# Patient Record
Sex: FEMALE | Race: OTHER | Employment: PART TIME | URBAN - METROPOLITAN AREA
[De-identification: names, ages, dates, MRNs, and addresses within clinical notes are randomized per-mention and may not be internally consistent; named-entity substitution may affect disease eponyms.]

---

## 2023-01-06 VITALS
TEMPERATURE: 98.2 F | DIASTOLIC BLOOD PRESSURE: 86 MMHG | HEART RATE: 102 BPM | SYSTOLIC BLOOD PRESSURE: 142 MMHG | BODY MASS INDEX: 31.01 KG/M2 | WEIGHT: 175 LBS | OXYGEN SATURATION: 99 % | HEIGHT: 63 IN | RESPIRATION RATE: 16 BRPM

## 2023-01-06 LAB
ALBUMIN SERPL-MCNC: 4.3 G/DL (ref 3.5–5)
ALBUMIN/GLOB SERPL: 1.2 {RATIO} (ref 1.1–2.2)
ALP SERPL-CCNC: 68 U/L (ref 45–117)
ALT SERPL-CCNC: 21 U/L (ref 12–78)
ANION GAP SERPL CALC-SCNC: 6 MMOL/L (ref 5–15)
AST SERPL-CCNC: 12 U/L (ref 15–37)
BASOPHILS # BLD: 0 K/UL (ref 0–0.1)
BASOPHILS NFR BLD: 1 % (ref 0–1)
BILIRUB SERPL-MCNC: 0.4 MG/DL (ref 0.2–1)
BUN SERPL-MCNC: 13 MG/DL (ref 6–20)
BUN/CREAT SERPL: 16 (ref 12–20)
CALCIUM SERPL-MCNC: 9.8 MG/DL (ref 8.5–10.1)
CHLORIDE SERPL-SCNC: 109 MMOL/L (ref 97–108)
CO2 SERPL-SCNC: 28 MMOL/L (ref 21–32)
COMMENT, HOLDF: NORMAL
CREAT SERPL-MCNC: 0.8 MG/DL (ref 0.55–1.02)
DIFFERENTIAL METHOD BLD: ABNORMAL
EOSINOPHIL # BLD: 0.1 K/UL (ref 0–0.4)
EOSINOPHIL NFR BLD: 2 % (ref 0–7)
ERYTHROCYTE [DISTWIDTH] IN BLOOD BY AUTOMATED COUNT: 11.7 % (ref 11.5–14.5)
GLOBULIN SER CALC-MCNC: 3.5 G/DL (ref 2–4)
GLUCOSE SERPL-MCNC: 123 MG/DL (ref 65–100)
HCT VFR BLD AUTO: 40.6 % (ref 35–47)
HGB BLD-MCNC: 13.4 G/DL (ref 11.5–16)
IMM GRANULOCYTES # BLD AUTO: 0 K/UL (ref 0–0.04)
IMM GRANULOCYTES NFR BLD AUTO: 0 % (ref 0–0.5)
LYMPHOCYTES # BLD: 3.7 K/UL (ref 0.8–3.5)
LYMPHOCYTES NFR BLD: 44 % (ref 12–49)
MCH RBC QN AUTO: 29.3 PG (ref 26–34)
MCHC RBC AUTO-ENTMCNC: 33 G/DL (ref 30–36.5)
MCV RBC AUTO: 88.6 FL (ref 80–99)
MONOCYTES # BLD: 0.8 K/UL (ref 0–1)
MONOCYTES NFR BLD: 9 % (ref 5–13)
NEUTS SEG # BLD: 3.8 K/UL (ref 1.8–8)
NEUTS SEG NFR BLD: 44 % (ref 32–75)
NRBC # BLD: 0 K/UL (ref 0–0.01)
NRBC BLD-RTO: 0 PER 100 WBC
PLATELET # BLD AUTO: 442 K/UL (ref 150–400)
PMV BLD AUTO: 9.1 FL (ref 8.9–12.9)
POTASSIUM SERPL-SCNC: 3.5 MMOL/L (ref 3.5–5.1)
PROT SERPL-MCNC: 7.8 G/DL (ref 6.4–8.2)
RBC # BLD AUTO: 4.58 M/UL (ref 3.8–5.2)
SAMPLES BEING HELD,HOLD: NORMAL
SODIUM SERPL-SCNC: 143 MMOL/L (ref 136–145)
WBC # BLD AUTO: 8.4 K/UL (ref 3.6–11)

## 2023-01-06 PROCEDURE — 84443 ASSAY THYROID STIM HORMONE: CPT

## 2023-01-06 PROCEDURE — 36415 COLL VENOUS BLD VENIPUNCTURE: CPT

## 2023-01-06 PROCEDURE — 84439 ASSAY OF FREE THYROXINE: CPT

## 2023-01-06 PROCEDURE — 99284 EMERGENCY DEPT VISIT MOD MDM: CPT

## 2023-01-06 PROCEDURE — 80053 COMPREHEN METABOLIC PANEL: CPT

## 2023-01-06 PROCEDURE — 85025 COMPLETE CBC W/AUTO DIFF WBC: CPT

## 2023-01-07 ENCOUNTER — HOSPITAL ENCOUNTER (EMERGENCY)
Age: 23
Discharge: HOME | End: 2023-01-07
Attending: EMERGENCY MEDICINE
Payer: COMMERCIAL

## 2023-01-07 DIAGNOSIS — R53.83 FATIGUE, UNSPECIFIED TYPE: ICD-10-CM

## 2023-01-07 DIAGNOSIS — R79.89 ELEVATED TSH: ICD-10-CM

## 2023-01-07 DIAGNOSIS — R42 DIZZINESS: Primary | ICD-10-CM

## 2023-01-07 LAB
APPEARANCE UR: CLEAR
ATRIAL RATE: 92 BPM
BACTERIA URNS QL MICRO: NEGATIVE /HPF
BILIRUB UR QL: NEGATIVE
CALCULATED P AXIS, ECG09: 17 DEGREES
CALCULATED R AXIS, ECG10: 44 DEGREES
CALCULATED T AXIS, ECG11: 12 DEGREES
COLOR UR: NORMAL
DIAGNOSIS, 93000: NORMAL
EPITH CASTS URNS QL MICRO: NORMAL /LPF
GLUCOSE UR STRIP.AUTO-MCNC: NEGATIVE MG/DL
HCG UR QL: NEGATIVE
HGB UR QL STRIP: NEGATIVE
HYALINE CASTS URNS QL MICRO: NORMAL /LPF (ref 0–2)
KETONES UR QL STRIP.AUTO: NEGATIVE MG/DL
LEUKOCYTE ESTERASE UR QL STRIP.AUTO: NEGATIVE
NITRITE UR QL STRIP.AUTO: NEGATIVE
P-R INTERVAL, ECG05: 138 MS
PH UR STRIP: 6.5 [PH] (ref 5–8)
PROT UR STRIP-MCNC: NEGATIVE MG/DL
Q-T INTERVAL, ECG07: 346 MS
QRS DURATION, ECG06: 84 MS
QTC CALCULATION (BEZET), ECG08: 427 MS
RBC #/AREA URNS HPF: NORMAL /HPF (ref 0–5)
SP GR UR REFRACTOMETRY: 1.01 (ref 1–1.03)
T4 FREE SERPL-MCNC: 1.1 NG/DL (ref 0.8–1.5)
TSH SERPL DL<=0.05 MIU/L-ACNC: 5.52 UIU/ML (ref 0.36–3.74)
UR CULT HOLD, URHOLD: NORMAL
UROBILINOGEN UR QL STRIP.AUTO: 0.2 EU/DL (ref 0.2–1)
VENTRICULAR RATE, ECG03: 92 BPM
WBC URNS QL MICRO: NORMAL /HPF (ref 0–4)

## 2023-01-07 PROCEDURE — 81025 URINE PREGNANCY TEST: CPT

## 2023-01-07 PROCEDURE — 81001 URINALYSIS AUTO W/SCOPE: CPT

## 2023-01-07 PROCEDURE — 74011250636 HC RX REV CODE- 250/636: Performed by: EMERGENCY MEDICINE

## 2023-01-07 RX ADMIN — SODIUM CHLORIDE 1000 ML: 9 INJECTION, SOLUTION INTRAVENOUS at 01:27

## 2023-01-07 NOTE — ED TRIAGE NOTES
Patient co lightheadedness and dizziness x 6 months worse today, saying she is feeling warm flushes down her body, states \" I just dont feel right\".

## 2023-01-07 NOTE — ED PROVIDER NOTES
Patient co lightheadedness and dizziness x 6 months worse today, saying she is feeling warm flushes down her body, states \" I just dont feel right\". 19-year-old female presenting to the ER with report of lightheadedness and dizziness and feeling of fatigue and feeling flushed. Patient's been having the symptoms intermittently for the last 6 months. Patient has been having work-up done by her primary care provider. Reports that vitamin D level was low and started on treatment and been having some fluctuating TSH levels. Had recurrent episodes today which she just did not feel right. No numbness or weakness but was having some tingling throughout. Denies any hyperventilation. No headache. No chest pain or palpitations. No fevers or chills but today felt flushed. Reports that she has been eating drinking normally. No weight gain or weight loss. Denies any change in menstrual cycle or bowel habits. No hair thinning. No past medical history on file. No past surgical history on file. No family history on file.     Social History     Socioeconomic History    Marital status:      Spouse name: Not on file    Number of children: Not on file    Years of education: Not on file    Highest education level: Not on file   Occupational History    Not on file   Tobacco Use    Smoking status: Not on file    Smokeless tobacco: Not on file   Substance and Sexual Activity    Alcohol use: Not on file    Drug use: Not on file    Sexual activity: Not on file   Other Topics Concern    Not on file   Social History Narrative    Not on file     Social Determinants of Health     Financial Resource Strain: Not on file   Food Insecurity: Not on file   Transportation Needs: Not on file   Physical Activity: Not on file   Stress: Not on file   Social Connections: Not on file   Intimate Partner Violence: Not on file   Housing Stability: Not on file         ALLERGIES: Patient has no allergy information on record. Review of Systems   Constitutional:  Positive for fatigue. Negative for chills and fever. HENT:  Negative for congestion and sore throat. Eyes:  Negative for pain. Respiratory:  Negative for shortness of breath. Cardiovascular:  Negative for chest pain. Gastrointestinal:  Negative for abdominal pain, diarrhea, nausea and vomiting. Genitourinary:  Negative for dysuria and flank pain. Musculoskeletal:  Negative for back pain and neck pain. Skin:  Negative for rash. Neurological:  Positive for dizziness and light-headedness. Negative for headaches. All other systems reviewed and are negative. Vitals:    01/06/23 2303   BP: (!) 142/86   Pulse: (!) 102   Resp: 16   Temp: 98.2 °F (36.8 °C)   SpO2: 99%   Weight: 79.4 kg (175 lb)   Height: 5' 3\" (1.6 m)            Physical Exam  Vitals and nursing note reviewed. Constitutional:       Appearance: She is well-developed. HENT:      Head: Normocephalic. Nose: Nose normal.      Mouth/Throat:      Mouth: Mucous membranes are moist.   Eyes:      Conjunctiva/sclera: Conjunctivae normal.   Cardiovascular:      Rate and Rhythm: Normal rate and regular rhythm. Pulmonary:      Effort: Pulmonary effort is normal. No respiratory distress. Breath sounds: Normal breath sounds. Abdominal:      General: Bowel sounds are normal.      Palpations: Abdomen is soft. Tenderness: There is no abdominal tenderness. Musculoskeletal:         General: Normal range of motion. Cervical back: Normal range of motion and neck supple. Skin:     General: Skin is warm. Capillary Refill: Capillary refill takes less than 2 seconds. Findings: No rash. Neurological:      Mental Status: She is alert and oriented to person, place, and time. Comments: No gross motor or sensory deficits        Medical Decision Making  Patient with ongoing symptoms of dizziness lightheadedness fatigue. Patient has been having work-up done outpatient. But had episode done today. Due to the dizziness ordered EKG and labs and electrolytes. EKG was unremarkable and personal interpretation in ED course. Ordered labs and electrolytes. No electrolyte abnormalities normal kidney function normal CBC. TSH mildly elevated we will send T4 however not significantly elevated to the point they should be causing her symptoms. Advised patient followed up with her family doctor. Amount and/or Complexity of Data Reviewed  Labs: ordered. Decision-making details documented in ED Course. ECG/medicine tests: ordered and independent interpretation performed. Decision-making details documented in ED Course. ED Course as of 01/07/23 0135   Sat Jan 07, 2023   0116 1:16 AM  EKG: NSR. Rate 92. Normal intervals, normal axis, no ST-elevations or depressions. No signs of ischemia. Interpreted by Jake Myrick MD       [ZD]      ED Course User Index  [ZD] Alvaro Sánchez MD       Procedures      Recent Results (from the past 24 hour(s))   CBC WITH AUTOMATED DIFF    Collection Time: 01/06/23 11:11 PM   Result Value Ref Range    WBC 8.4 3.6 - 11.0 K/uL    RBC 4.58 3.80 - 5.20 M/uL    HGB 13.4 11.5 - 16.0 g/dL    HCT 40.6 35.0 - 47.0 %    MCV 88.6 80.0 - 99.0 FL    MCH 29.3 26.0 - 34.0 PG    MCHC 33.0 30.0 - 36.5 g/dL    RDW 11.7 11.5 - 14.5 %    PLATELET 239 (H) 564 - 400 K/uL    MPV 9.1 8.9 - 12.9 FL    NRBC 0.0 0  WBC    ABSOLUTE NRBC 0.00 0.00 - 0.01 K/uL    NEUTROPHILS 44 32 - 75 %    LYMPHOCYTES 44 12 - 49 %    MONOCYTES 9 5 - 13 %    EOSINOPHILS 2 0 - 7 %    BASOPHILS 1 0 - 1 %    IMMATURE GRANULOCYTES 0 0.0 - 0.5 %    ABS. NEUTROPHILS 3.8 1.8 - 8.0 K/UL    ABS. LYMPHOCYTES 3.7 (H) 0.8 - 3.5 K/UL    ABS. MONOCYTES 0.8 0.0 - 1.0 K/UL    ABS. EOSINOPHILS 0.1 0.0 - 0.4 K/UL    ABS. BASOPHILS 0.0 0.0 - 0.1 K/UL    ABS. IMM.  GRANS. 0.0 0.00 - 0.04 K/UL    DF AUTOMATED     METABOLIC PANEL, COMPREHENSIVE    Collection Time: 01/06/23 11:11 PM   Result Value Ref Range Sodium 143 136 - 145 mmol/L    Potassium 3.5 3.5 - 5.1 mmol/L    Chloride 109 (H) 97 - 108 mmol/L    CO2 28 21 - 32 mmol/L    Anion gap 6 5 - 15 mmol/L    Glucose 123 (H) 65 - 100 mg/dL    BUN 13 6 - 20 MG/DL    Creatinine 0.80 0.55 - 1.02 MG/DL    BUN/Creatinine ratio 16 12 - 20      eGFR >60 >60 ml/min/1.73m2    Calcium 9.8 8.5 - 10.1 MG/DL    Bilirubin, total 0.4 0.2 - 1.0 MG/DL    ALT (SGPT) 21 12 - 78 U/L    AST (SGOT) 12 (L) 15 - 37 U/L    Alk. phosphatase 68 45 - 117 U/L    Protein, total 7.8 6.4 - 8.2 g/dL    Albumin 4.3 3.5 - 5.0 g/dL    Globulin 3.5 2.0 - 4.0 g/dL    A-G Ratio 1.2 1.1 - 2.2     SAMPLES BEING HELD    Collection Time: 01/06/23 11:11 PM   Result Value Ref Range    SAMPLES BEING HELD [id number]     COMMENT        Add-on orders for these samples will be processed based on acceptable specimen integrity and analyte stability, which may vary by analyte. TSH 3RD GENERATION    Collection Time: 01/06/23 11:11 PM   Result Value Ref Range    TSH 5.52 (H) 0.36 - 3.74 uIU/mL   EKG, 12 LEAD, INITIAL    Collection Time: 01/07/23  1:13 AM   Result Value Ref Range    Ventricular Rate 92 BPM    Atrial Rate 92 BPM    P-R Interval 138 ms    QRS Duration 84 ms    Q-T Interval 346 ms    QTC Calculation (Bezet) 427 ms    Calculated P Axis 17 degrees    Calculated R Axis 44 degrees    Calculated T Axis 12 degrees    Diagnosis       Normal sinus rhythm  Normal ECG  No previous ECGs available     URINE CULTURE HOLD SAMPLE    Collection Time: 01/07/23  1:27 AM    Specimen: Urine   Result Value Ref Range    Urine culture hold        Urine on hold in Microbiology dept for 2 days. If unpreserved urine is submitted, it cannot be used for addtional testing after 24 hours, recollection will be required. No results found.

## 2023-03-16 ENCOUNTER — OFFICE VISIT (OUTPATIENT)
Dept: NEUROLOGY | Age: 23
End: 2023-03-16

## 2023-03-16 ENCOUNTER — TELEPHONE (OUTPATIENT)
Dept: NEUROLOGY | Age: 23
End: 2023-03-16

## 2023-03-16 VITALS
OXYGEN SATURATION: 98 % | RESPIRATION RATE: 18 BRPM | SYSTOLIC BLOOD PRESSURE: 129 MMHG | HEIGHT: 63 IN | BODY MASS INDEX: 31.36 KG/M2 | DIASTOLIC BLOOD PRESSURE: 73 MMHG | WEIGHT: 177 LBS | HEART RATE: 106 BPM

## 2023-03-16 DIAGNOSIS — R20.2 PARESTHESIA: Primary | ICD-10-CM

## 2023-03-16 DIAGNOSIS — G43.909 MIGRAINE WITHOUT STATUS MIGRAINOSUS, NOT INTRACTABLE, UNSPECIFIED MIGRAINE TYPE: ICD-10-CM

## 2023-03-16 RX ORDER — ALBUTEROL SULFATE 90 UG/1
AEROSOL, METERED RESPIRATORY (INHALATION)
COMMUNITY

## 2023-03-16 RX ORDER — ESCITALOPRAM OXALATE 10 MG/1
TABLET ORAL
COMMUNITY
Start: 2023-02-02

## 2023-03-16 RX ORDER — CETIRIZINE HYDROCHLORIDE 10 MG/1
TABLET ORAL
COMMUNITY

## 2023-03-16 RX ORDER — ERGOCALCIFEROL 1.25 MG/1
1 CAPSULE ORAL
COMMUNITY
Start: 2022-06-10 | End: 2023-08-01

## 2023-03-16 RX ORDER — BUTALBITAL, ACETAMINOPHEN AND CAFFEINE 50; 325; 40 MG/1; MG/1; MG/1
TABLET ORAL
Qty: 30 TABLET | Refills: 4 | Status: SHIPPED | OUTPATIENT
Start: 2023-03-16

## 2023-03-16 RX ORDER — GALCANEZUMAB 120 MG/ML
120 INJECTION, SOLUTION SUBCUTANEOUS
Qty: 1 ML | Refills: 4 | Status: SHIPPED | OUTPATIENT
Start: 2023-03-16

## 2023-03-16 NOTE — PATIENT INSTRUCTIONS
[facility] Test Result Communication    Test results are available in [address] [employer] is the patient portal into our electronic health record. This feature allows patients to see diagnostic test results, immunizations, allergies, past medical and surgical history, current medications, and send messages directly to providers. Our team members at the  can provide additional information and assist with registration. The [employer] support team can be reached at [phone]. In some cases, a provider might need time to explain the results in detail during a follow-up appointment. This might include additional information or context that will help patients understand the reason for next steps in the plan of care recommended by their provider. If a patient chooses to receive diagnostic testing at an imaging center outside of the [facility], it is the patient's responsibility to bring the imaging report and disc to their [facility] follow-up appointment. If the test results reveal anything that is particularly noteworthy, we will contact you to discuss the matter and, if necessary, schedule a follow-up appointment at an earlier date. If you have not received your test results by [employer] or other communication within 7 days, please contact our office. An inquiry can be sent to your provider using [employer]. Alternatively, appointments can be scheduled via telephone to review results. If a follow-up appointment to review results has not been scheduled, [address] office can be reached at [phone]. For appointments at our [facility] or [facility] office, please call [phone].        [facility]   Statement to Patients  April 1, 2014      In an effort to ensure the large volume of patient prescription refills is processed in the most efficient and expeditious manner, we are asking our patients to assist us by calling your Pharmacy for all prescription refills, this will include also your  Mail Order Pharmacy. The pharmacy will contact our office electronically to continue the refill process. Please do not wait until the last minute to call your pharmacy. We need at least 48 hours (2days) to fill prescriptions. We also encourage you to call your pharmacy before going to  your prescription to make sure it is ready. With regard to controlled substance prescription refill requests (narcotic refills) that need to be picked up at our office, we ask your cooperation by providing us with at least 72 hours (3days) notice that you will need a refill. We will not refill narcotic prescription refill requests after 4:00pm on any weekday, Monday through Thursday, or after 2:00pm on Fridays, or on the weekends. We encourage everyone to explore another way of getting your prescription refill request processed using [employer], our patient web portal through our electronic medical record system. [employer] is an efficient and effective way to communicate your medication request directly to the office and  downloadable as an mary kate on your smart phone . [employer] also features a review functionality that allows you to view your medication list as well as leave messages for your physician. Are you ready to get connected? If so please review the attatched instructions or speak to any of our staff to get you set up right away! Thank you so much for your cooperation. Should you have any questions please contact our Practice Administrator.

## 2023-03-17 NOTE — PROGRESS NOTES
Mariela Adame is a 21 y.o. female who presents with the following  Chief Complaint   Patient presents with    New Patient    Headache     Started a few mo ago, has almost daily HAs., occasional nausea, phonophobia.       Dizziness     With fatigue since last June, uses CPAP, dx'd with mild DELL    Ringing in Ear     A couple of times per week, mainly left ear       HPI    New patient comes in for headaches and paresthesia  She does notice that she does get migraines fairly regularly here now  She does have headaches that come frequently also  She does have nausea and hypersensitivity to light sound and smell  They are usually located unilateral as a sharp pain  She has been having some dizziness and fatigue also since last June  She was just recently diagnosed with sleep apnea and did just start her nasal CPAP here recently  Nothing changed yet  She does get some tendinitis in the ears couple times a week  Mainly the left ear  She has had migraines for quite some time  In the past as a younger woman she has been on gabapentin and Topamax  She is currently on Lexapro and feeling stable with this  Unfortunately her blood pressure is low so she is not able to use any kind of blood pressure medications  So we will initiate Emgality here in the office as a preventative for migraine  She has used Fioricet in the past as a rescue  She has also used triptans in the past including Maxalt and Imitrex    She is not really sure of much triggers yet  She is also in school and working  So she is very busy  She is sleeping fairly well  She has a normal appetite  No loss of consciousness or confusion  No memory loss  Overall she feels like everything else is good    She has a family history of MS  She does have some left-sided facial numbness and then right leg numbness and tingling  These both come and go without any kind of cause  They do come with out her headaches also so were not really sure where they are coming from        No Known Allergies    Current Outpatient Medications   Medication Sig    ergocalciferol (ERGOCALCIFEROL) 1,250 mcg (50,000 unit) capsule 1 Capsule. escitalopram oxalate (LEXAPRO) 10 mg tablet 1 tablet Orally Once a day for 90 days    cetirizine (ZYRTEC) 10 mg tablet Zyrtec 10 mg tablet   Take 1 tablet every day by oral route. albuterol (PROVENTIL HFA, VENTOLIN HFA, PROAIR HFA) 90 mcg/actuation inhaler 1 puff as needed Inhalation every 4 hrs for 30 days    galcanezumab-gnlm (Emgality Pen) 120 mg/mL injection 1 mL by SubCUTAneous route every thirty (30) days. ubrogepant Valeta Speer) 100 mg tablet 1 at HA onset and repeat in 2 hours if needed. Max 2 in 24 hours    butalbital-acetaminophen-caffeine (FIORICET, ESGIC) -40 mg per tablet 1-2 tablets by mouth every 8 hours PRN     No current facility-administered medications for this visit. Social History     Tobacco Use   Smoking Status Never   Smokeless Tobacco Never       Past Medical History:   Diagnosis Date    Abnormal TSH     Asthma     DELL (obstructive sleep apnea)        Past Surgical History:   Procedure Laterality Date    HX TYMPANOSTOMY      HX WISDOM TEETH EXTRACTION Bilateral        Family History   Problem Relation Age of Onset    Thyroid Disease Mother     Kidney Disease Father     Asthma Father        Social History     Socioeconomic History    Marital status:    Tobacco Use    Smoking status: Never    Smokeless tobacco: Never   Vaping Use    Vaping Use: Never used   Substance and Sexual Activity    Alcohol use: Yes     Comment: occasional    Drug use: Not Currently       Review of Systems   Eyes:  Positive for blurred vision, double vision and photophobia. Respiratory:  Negative for shortness of breath and wheezing. Cardiovascular:  Negative for chest pain and palpitations. Gastrointestinal:  Positive for nausea and vomiting. Neurological:  Positive for dizziness, tingling, sensory change and headaches. Negative for tremors, seizures, loss of consciousness and weakness. Remainder of comprehensive review is negative. Physical Exam :    Visit Vitals  /73   Pulse (!) 106   Resp 18   Ht 5' 3\" (1.6 m)   Wt 80.3 kg (177 lb)   SpO2 98%   BMI 31.35 kg/m²       General: Well defined, nourished, and groomed individual in no acute distress. Musculoskeletal: Extremities revealed no edema and had full range of motion of joints. Psych: Good mood and bright affect    NEUROLOGICAL EXAMINATION:    Mental Status: Alert and oriented to person, place, and time    Cranial Nerves:    II, III, IV, VI: Visual acuity grossly intact. Visual fields are normal.    Pupils are equal, round, and reactive to light and accommodation. Extra-ocular movements are full and fluid. Fundoscopic exam was benign, no ptosis or nystagmus. V-XII: Hearing is grossly intact. Facial features are symmetric, with normal sensation and strength. The palate rises symmetrically and the tongue protrudes midline. Sternocleidomastoids 5/5. Motor Examination: Normal tone, bulk, and strength, 5/5 muscle strength throughout. Coordination: Finger to nose was normal. No resting or intention tremor    Gait and Station: Steady while walking. Normal arm swing. No pronator drift. No muscle wasting or fasiculations noted. Reflexes: DTRs 2+ throughout. Results for orders placed or performed during the hospital encounter of 01/07/23   URINE CULTURE HOLD SAMPLE    Specimen: Urine   Result Value Ref Range    Urine culture hold        Urine on hold in Microbiology dept for 2 days. If unpreserved urine is submitted, it cannot be used for addtional testing after 24 hours, recollection will be required.    CBC WITH AUTOMATED DIFF   Result Value Ref Range    WBC 8.4 3.6 - 11.0 K/uL    RBC 4.58 3.80 - 5.20 M/uL    HGB 13.4 11.5 - 16.0 g/dL    HCT 40.6 35.0 - 47.0 %    MCV 88.6 80.0 - 99.0 FL    MCH 29.3 26.0 - 34.0 PG    MCHC 33.0 30.0 - 36.5 g/dL    RDW 11.7 11.5 - 14.5 %    PLATELET 021 (H) 023 - 400 K/uL    MPV 9.1 8.9 - 12.9 FL    NRBC 0.0 0  WBC    ABSOLUTE NRBC 0.00 0.00 - 0.01 K/uL    NEUTROPHILS 44 32 - 75 %    LYMPHOCYTES 44 12 - 49 %    MONOCYTES 9 5 - 13 %    EOSINOPHILS 2 0 - 7 %    BASOPHILS 1 0 - 1 %    IMMATURE GRANULOCYTES 0 0.0 - 0.5 %    ABS. NEUTROPHILS 3.8 1.8 - 8.0 K/UL    ABS. LYMPHOCYTES 3.7 (H) 0.8 - 3.5 K/UL    ABS. MONOCYTES 0.8 0.0 - 1.0 K/UL    ABS. EOSINOPHILS 0.1 0.0 - 0.4 K/UL    ABS. BASOPHILS 0.0 0.0 - 0.1 K/UL    ABS. IMM. GRANS. 0.0 0.00 - 0.04 K/UL    DF AUTOMATED     METABOLIC PANEL, COMPREHENSIVE   Result Value Ref Range    Sodium 143 136 - 145 mmol/L    Potassium 3.5 3.5 - 5.1 mmol/L    Chloride 109 (H) 97 - 108 mmol/L    CO2 28 21 - 32 mmol/L    Anion gap 6 5 - 15 mmol/L    Glucose 123 (H) 65 - 100 mg/dL    BUN 13 6 - 20 MG/DL    Creatinine 0.80 0.55 - 1.02 MG/DL    BUN/Creatinine ratio 16 12 - 20      eGFR >60 >60 ml/min/1.73m2    Calcium 9.8 8.5 - 10.1 MG/DL    Bilirubin, total 0.4 0.2 - 1.0 MG/DL    ALT (SGPT) 21 12 - 78 U/L    AST (SGOT) 12 (L) 15 - 37 U/L    Alk. phosphatase 68 45 - 117 U/L    Protein, total 7.8 6.4 - 8.2 g/dL    Albumin 4.3 3.5 - 5.0 g/dL    Globulin 3.5 2.0 - 4.0 g/dL    A-G Ratio 1.2 1.1 - 2.2     SAMPLES BEING HELD   Result Value Ref Range    SAMPLES BEING HELD [id number]     COMMENT        Add-on orders for these samples will be processed based on acceptable specimen integrity and analyte stability, which may vary by analyte.    TSH 3RD GENERATION   Result Value Ref Range    TSH 5.52 (H) 0.36 - 3.74 uIU/mL   URINALYSIS W/MICROSCOPIC   Result Value Ref Range    Color YELLOW/STRAW      Appearance CLEAR CLEAR      Specific gravity 1.013 1.003 - 1.030      pH (UA) 6.5 5.0 - 8.0      Protein Negative NEG mg/dL    Glucose Negative NEG mg/dL    Ketone Negative NEG mg/dL    Bilirubin Negative NEG      Blood Negative NEG      Urobilinogen 0.2 0.2 - 1.0 EU/dL    Nitrites Negative NEG Leukocyte Esterase Negative NEG      WBC 0-4 0 - 4 /hpf    RBC 0-5 0 - 5 /hpf    Epithelial cells FEW FEW /lpf    Bacteria Negative NEG /hpf    Hyaline cast 0-2 0 - 2 /lpf   T4, FREE   Result Value Ref Range    T4, Free 1.1 0.8 - 1.5 NG/DL   HCG URINE, QL. - POC   Result Value Ref Range    Pregnancy test,urine (POC) Negative NEG     EKG, 12 LEAD, INITIAL   Result Value Ref Range    Ventricular Rate 92 BPM    Atrial Rate 92 BPM    P-R Interval 138 ms    QRS Duration 84 ms    Q-T Interval 346 ms    QTC Calculation (Bezet) 427 ms    Calculated P Axis 17 degrees    Calculated R Axis 44 degrees    Calculated T Axis 12 degrees    Diagnosis       Normal sinus rhythm  Normal ECG  No previous ECGs available  Confirmed by Damián Renee M.D., Hans Ellison ([id number]) on 1/10/2023 5:07:55 PM         Total time: 40 min   Counseling / coordination time: 35 min   > 50% counseling / coordination?: Yes re: medications, treatment, disease process, imaging. Orders Placed This Encounter    MRI BRAIN W WO CONT     Standing Status:   Future     Standing Expiration Date:   2024     Order Specific Question:   Is Patient Pregnant? Answer:   No     Order Specific Question:   STAT Creatinine as indicated     Answer:   Yes    ergocalciferol (ERGOCALCIFEROL) 1,250 mcg (50,000 unit) capsule     Si Capsule. escitalopram oxalate (LEXAPRO) 10 mg tablet     Si tablet Orally Once a day for 90 days    cetirizine (ZYRTEC) 10 mg tablet     Sig: Zyrtec 10 mg tablet   Take 1 tablet every day by oral route. albuterol (PROVENTIL HFA, VENTOLIN HFA, PROAIR HFA) 90 mcg/actuation inhaler     Si puff as needed Inhalation every 4 hrs for 30 days    galcanezumab-gnlm (Emgality Pen) 120 mg/mL injection     Si mL by SubCUTAneous route every thirty (30) days. Dispense:  1 mL     Refill:  4    ubrogepant (Ubrelvy) 100 mg tablet     Si at HA onset and repeat in 2 hours if needed.   Max 2 in 24 hours     Dispense:  16 Tablet     Refill:  4 butalbital-acetaminophen-caffeine (FIORICET, ESGIC) -40 mg per tablet     Si-2 tablets by mouth every 8 hours PRN     Dispense:  30 Tablet     Refill:  4       1. Paresthesia    2.  Migraine without status migrainosus, not intractable, unspecified migraine type    We need an MRI of the brain to rule out demyelinating disease as primary cause of migraines  Left facial paresthesia  And right leg paresthesia    She does have a family history of MS and this is needed to ensure this is not the primary cause of everything    We will start Emgality in the office and she got the first 2 injections here as a loading sample dose in the office  120 mg every 30 days thereafter    We will refill the Fioricet because when she was younger this did work  We will try Gabriella Herrera as a rescue though for right now to make sure that she can take this and still function    Follow-up after testing and we will evaluate further                This note will not be viewable in [address]

## 2023-03-23 RX ORDER — GALCANEZUMAB 120 MG/ML
INJECTION, SOLUTION SUBCUTANEOUS
Qty: 1 ML | Refills: 4 | Status: SHIPPED | OUTPATIENT
Start: 2023-03-23

## 2023-05-25 ENCOUNTER — PATIENT MESSAGE (OUTPATIENT)
Age: 23
End: 2023-05-25

## 2023-05-25 DIAGNOSIS — R90.89 ABNORMAL BRAIN MRI: ICD-10-CM

## 2023-05-25 DIAGNOSIS — R20.2 PARESTHESIA OF SKIN: Primary | ICD-10-CM

## 2023-05-25 NOTE — TELEPHONE ENCOUNTER
SUSAN Kirby NP 5/25/2023 4:24 PM EDT    Can you fax MRI c spine   See below          ----- Message -----  From: Kat Us  Sent: 5/25/2023 2:55 PM EDT  To: SUSAN Kirby NP  Subject: MRI     Thank you for looking over my MRI. What would the next steps be for ruling out MS? Would you be able send the MRI order to [address] [place] [address]?     Thank you again,  Cathleen Siu

## 2023-06-16 ENCOUNTER — TELEPHONE (OUTPATIENT)
Age: 23
End: 2023-06-16

## 2023-06-16 ENCOUNTER — OFFICE VISIT (OUTPATIENT)
Age: 23
End: 2023-06-16
Payer: COMMERCIAL

## 2023-06-16 VITALS — OXYGEN SATURATION: 99 % | DIASTOLIC BLOOD PRESSURE: 76 MMHG | HEART RATE: 97 BPM | SYSTOLIC BLOOD PRESSURE: 104 MMHG

## 2023-06-16 DIAGNOSIS — G43.909 MIGRAINE WITHOUT STATUS MIGRAINOSUS, NOT INTRACTABLE, UNSPECIFIED MIGRAINE TYPE: Primary | ICD-10-CM

## 2023-06-16 DIAGNOSIS — F90.0 ATTENTION DEFICIT HYPERACTIVITY DISORDER (ADHD), PREDOMINANTLY INATTENTIVE TYPE: ICD-10-CM

## 2023-06-16 PROCEDURE — 99214 OFFICE O/P EST MOD 30 MIN: CPT | Performed by: NURSE PRACTITIONER

## 2023-06-16 RX ORDER — BUTALBITAL, ACETAMINOPHEN AND CAFFEINE 50; 325; 40 MG/1; MG/1; MG/1
TABLET ORAL
COMMUNITY
Start: 2023-03-16

## 2023-06-16 RX ORDER — UBROGEPANT 100 MG/1
TABLET ORAL
COMMUNITY
Start: 2023-05-15

## 2023-06-16 RX ORDER — ESCITALOPRAM OXALATE 10 MG/1
TABLET ORAL
COMMUNITY
Start: 2023-02-02

## 2023-06-16 RX ORDER — ALBUTEROL SULFATE 90 UG/1
AEROSOL, METERED RESPIRATORY (INHALATION)
COMMUNITY

## 2023-06-16 RX ORDER — GALCANEZUMAB 120 MG/ML
INJECTION, SOLUTION SUBCUTANEOUS
COMMUNITY
Start: 2023-03-23

## 2023-06-16 RX ORDER — ERGOCALCIFEROL 1.25 MG/1
CAPSULE ORAL
COMMUNITY
Start: 2023-05-10

## 2023-06-16 RX ORDER — CETIRIZINE HYDROCHLORIDE 10 MG/1
TABLET ORAL
COMMUNITY

## 2023-06-19 NOTE — PROGRESS NOTES
(obstructive sleep apnea)        Past Surgical History:   Procedure Laterality Date    TYMPANOSTOMY TUBE PLACEMENT      WISDOM TOOTH EXTRACTION Bilateral        Family History   Problem Relation Age of Onset    Thyroid Disease Mother     Asthma Father     Kidney Disease Father        Social History     Socioeconomic History    Marital status:    Tobacco Use    Smoking status: Never    Smokeless tobacco: Never   Substance and Sexual Activity    Alcohol use: Yes    Drug use: Not Currently       Review of Systems      Remainder of comprehensive review is negative. Physical Exam :    /76 (Site: Left Upper Arm, Position: Sitting, Cuff Size: Medium Adult)   Pulse 97   SpO2 99%           MRI Result (most recent):  No results found for this or any previous visit from the past 3650 days. CT Result (most recent):  No results found for this or any previous visit from the past 3650 days.       EEG Result:      Carotid Doppler:        Recent Labs:  Lab Results   Component Value Date    WBC 8.4 01/06/2023    HGB 13.4 01/06/2023    HCT 40.6 01/06/2023    MCV 88.6 01/06/2023     (H) 01/06/2023     Lab Results   Component Value Date     01/06/2023    K 3.5 01/06/2023     (H) 01/06/2023    CO2 28 01/06/2023    BUN 13 01/06/2023    CREATININE 0.80 01/06/2023    GLUCOSE 123 (H) 01/06/2023    CALCIUM 9.8 01/06/2023    PROT 7.8 01/06/2023    LABALBU 4.3 01/06/2023    BILITOT 0.4 01/06/2023    ALKPHOS 68 01/06/2023    AST 12 (L) 01/06/2023    ALT 21 01/06/2023    AGRATIO 1.2 01/06/2023    GLOB 3.5 01/06/2023       No results found for: CHOL  No results found for: TRIG  No results found for: HDL  No results found for: LDLCHOLESTEROL, LDLCALC  No results found for: LABVLDL, VLDL  No results found for: CHOLHDLRATIO    No results found for: SEDRATE    No results found for: LABA1C  No results found for: MILDRED             1. Migraine without status migrainosus, not intractable, unspecified migraine

## 2023-06-27 DIAGNOSIS — F90.0 ATTENTION DEFICIT HYPERACTIVITY DISORDER (ADHD), PREDOMINANTLY INATTENTIVE TYPE: Primary | ICD-10-CM

## 2023-06-27 RX ORDER — LISDEXAMFETAMINE DIMESYLATE 40 MG/1
1 CAPSULE ORAL DAILY
Qty: 30 CAPSULE | Refills: 0 | Status: SHIPPED | OUTPATIENT
Start: 2023-06-27 | End: 2023-07-27

## 2023-06-30 DIAGNOSIS — R20.2 PARESTHESIA OF SKIN: Primary | ICD-10-CM

## 2023-06-30 DIAGNOSIS — R93.89 ABNORMAL MRI: ICD-10-CM

## 2023-07-10 ENCOUNTER — HOSPITAL ENCOUNTER (OUTPATIENT)
Facility: HOSPITAL | Age: 23
Discharge: HOME | End: 2023-07-13
Payer: COMMERCIAL

## 2023-07-10 VITALS — DIASTOLIC BLOOD PRESSURE: 69 MMHG | OXYGEN SATURATION: 99 % | SYSTOLIC BLOOD PRESSURE: 113 MMHG | HEART RATE: 98 BPM

## 2023-07-10 DIAGNOSIS — R93.89 ABNORMAL MRI: ICD-10-CM

## 2023-07-10 DIAGNOSIS — R20.2 PARESTHESIA OF SKIN: ICD-10-CM

## 2023-07-10 LAB
APPEARANCE CSF: CLEAR
COLOR CSF: COLORLESS
CRYPTOC AG CSF QL IA: NEGATIVE
PROT CSF-MCNC: 30 MG/DL (ref 15–45)
RBC # CSF: 0 /CU MM
TUBE # CSF: 1
TUBE # CSF: 4
WBC # CSF: 0 /CU MM (ref 0–5)

## 2023-07-10 PROCEDURE — 82040 ASSAY OF SERUM ALBUMIN: CPT

## 2023-07-10 PROCEDURE — 62328 DX LMBR SPI PNXR W/FLUOR/CT: CPT

## 2023-07-10 PROCEDURE — 86618 LYME DISEASE ANTIBODY: CPT

## 2023-07-10 PROCEDURE — 87070 CULTURE OTHR SPECIMN AEROBIC: CPT

## 2023-07-10 PROCEDURE — 89050 BODY FLUID CELL COUNT: CPT

## 2023-07-10 PROCEDURE — 82164 ANGIOTENSIN I ENZYME TEST: CPT

## 2023-07-10 PROCEDURE — 87205 SMEAR GRAM STAIN: CPT

## 2023-07-10 PROCEDURE — 83916 OLIGOCLONAL BANDS: CPT

## 2023-07-10 PROCEDURE — 2500000003 HC RX 250 WO HCPCS

## 2023-07-10 PROCEDURE — 86592 SYPHILIS TEST NON-TREP QUAL: CPT

## 2023-07-10 PROCEDURE — 83873 ASSAY OF CSF PROTEIN: CPT

## 2023-07-10 PROCEDURE — 82784 ASSAY IGA/IGD/IGG/IGM EACH: CPT

## 2023-07-10 PROCEDURE — 87798 DETECT AGENT NOS DNA AMP: CPT

## 2023-07-10 PROCEDURE — 87327 CRYPTOCOCCUS NEOFORM AG IA: CPT

## 2023-07-10 PROCEDURE — 86255 FLUORESCENT ANTIBODY SCREEN: CPT

## 2023-07-10 PROCEDURE — 84157 ASSAY OF PROTEIN OTHER: CPT

## 2023-07-10 PROCEDURE — 82042 OTHER SOURCE ALBUMIN QUAN EA: CPT

## 2023-07-10 RX ORDER — LIDOCAINE HYDROCHLORIDE 10 MG/ML
5 INJECTION, SOLUTION EPIDURAL; INFILTRATION; INTRACAUDAL; PERINEURAL ONCE
Status: COMPLETED | OUTPATIENT
Start: 2023-07-10 | End: 2023-07-10

## 2023-07-10 RX ADMIN — LIDOCAINE HYDROCHLORIDE ANHYDROUS 5 ML: 10 INJECTION, SOLUTION INFILTRATION at 10:41

## 2023-07-10 NOTE — PROGRESS NOTES
1005- Pt brought to radiology post LP supine on stretcher. Pt is A&OX 3 with no C/O pain. Pt stated \" it went well\". VS taken. Lower back band aid dry and intact. 1040- MS panel ordered. Two red tops sent to lab. Pt drinking water and eating cookies and peanut butter crackers.

## 2023-07-10 NOTE — DISCHARGE INSTRUCTIONS
Spinal Tap (Lumbar Puncture): What to Expect at [address]     A spinal tap (also called a lumbar puncture) is a test to check the fluid that surrounds and protects your spinal cord and brain. Your doctor may have done this test to look for an infection. Sometimes it's done to release pressure from too much fluid or to look for diseases such as multiple sclerosis. You may feel tired, and your back may be sore where the needle went in (the puncture site). You may have a mild headache for a day or two. This can happen when some of the spinal fluid is removed. Drinking extra fluids, taking pain medicine, and lying down for several hours after the procedure may help the headache be less severe. Some people also have trouble sleeping for a day or two. The fluid taken during a spinal tap is often sent to a lab for tests. Your doctor or nurse will call you with the test results. This care sheet gives you a general idea about how long it will take for you to recover. But each person recovers at a different pace. Follow the steps below to get better as quickly as possible. How can you care for yourself at home? Activity    Your doctor may tell you to lie flat in bed for 1 to 4 hours after the procedure. Rest when you feel tired. Getting enough sleep will help you recover. Ask your doctor when you can drive again. Diet    Drink extra fluids after the procedure to help a headache be less severe. Medicines    If you stopped taking aspirin or some other blood thinner, your doctor will tell you when to start taking it again. If you have pain, take pain medicines exactly as directed. If the doctor gave you a prescription medicine for pain, take it as prescribed. If you are not taking a prescription pain medicine, ask your doctor if you can take an over-the-counter medicine. If you think your pain medicine is making you sick to your stomach:   Take your medicine after meals (unless your

## 2023-07-11 LAB
BACTERIA SPEC CULT: NORMAL
GRAM STN SPEC: NORMAL
SERVICE CMNT-IMP: NORMAL

## 2023-07-12 LAB
ANGIOTENSIN CONVERTING ENZYME CSF: <1.5 U/L (ref 0–2.5)
MBP CSF-MCNC: 1.4 NG/ML (ref 0–2.9)
REAGIN AB CSF QL: NON REACTIVE

## 2023-07-14 LAB
ALB CSF/SERPL: 3 (ref 0–8)
ALBUMIN CSF-MCNC: 15 MG/DL (ref 7–29)
ALBUMIN SERPL-MCNC: 4.9 G/DL (ref 4–5)
IGG CSF-MCNC: 1.9 MG/DL (ref 0–6.7)
IGG SERPL-MCNC: 1052 MG/DL (ref 586–1602)
IGG SYNTH RATE SER+CSF CALC-MRATE: NORMAL MG/DAY
IGG/ALB CLEAR SER+CSF-RTO: 0.6 (ref 0–0.7)
IGG/ALB CSF: 0.13 (ref 0–0.25)
MBP CSF-MCNC: NORMAL NG/ML
OLIGOCLONAL BANDS.IT SER+CSF QL: NORMAL

## 2023-07-15 LAB — AQP4 H2O CHANNEL IGG CSF QL: NORMAL

## 2023-07-16 LAB — JC VIRUS DNA BY PCR: NEGATIVE

## 2023-07-17 LAB
BACTERIA SPEC CULT: NORMAL
GRAM STN SPEC: NORMAL
SERVICE CMNT-IMP: NORMAL

## 2023-07-20 LAB
B BURGDOR IGG CSF-ACNC: < 0.08 INDEX
B BURGDOR IGM CSF-ACNC: < 0.06 INDEX

## 2023-08-01 DIAGNOSIS — F90.0 ATTENTION DEFICIT HYPERACTIVITY DISORDER (ADHD), PREDOMINANTLY INATTENTIVE TYPE: ICD-10-CM

## 2023-08-01 RX ORDER — LISDEXAMFETAMINE DIMESYLATE 40 MG/1
1 CAPSULE ORAL DAILY
Qty: 30 CAPSULE | Refills: 0 | Status: CANCELLED | OUTPATIENT
Start: 2023-08-01 | End: 2023-08-31

## 2023-08-03 DIAGNOSIS — F90.0 ATTENTION DEFICIT HYPERACTIVITY DISORDER (ADHD), PREDOMINANTLY INATTENTIVE TYPE: ICD-10-CM

## 2023-08-03 RX ORDER — LISDEXAMFETAMINE DIMESYLATE 40 MG/1
1 CAPSULE ORAL DAILY
Qty: 30 CAPSULE | Refills: 0 | Status: SHIPPED | OUTPATIENT
Start: 2023-08-03 | End: 2023-09-02

## 2023-08-03 RX ORDER — GALCANEZUMAB 120 MG/ML
INJECTION, SOLUTION SUBCUTANEOUS
Qty: 1 ML | Refills: 3 | OUTPATIENT
Start: 2023-08-03

## 2023-08-23 DIAGNOSIS — R90.89 ABNORMAL BRAIN MRI: ICD-10-CM

## 2023-08-23 DIAGNOSIS — R20.2 PARESTHESIA OF SKIN: ICD-10-CM

## 2023-08-23 DIAGNOSIS — G43.909 MIGRAINE WITHOUT STATUS MIGRAINOSUS, NOT INTRACTABLE, UNSPECIFIED MIGRAINE TYPE: Primary | ICD-10-CM

## 2023-08-25 DIAGNOSIS — G43.909 MIGRAINE WITHOUT STATUS MIGRAINOSUS, NOT INTRACTABLE, UNSPECIFIED MIGRAINE TYPE: Primary | ICD-10-CM

## 2023-08-26 RX ORDER — GALCANEZUMAB 120 MG/ML
INJECTION, SOLUTION SUBCUTANEOUS
Qty: 1 ML | Refills: 3 | Status: SHIPPED | OUTPATIENT
Start: 2023-08-26

## 2023-09-18 ENCOUNTER — OFFICE VISIT (OUTPATIENT)
Age: 23
End: 2023-09-18
Payer: COMMERCIAL

## 2023-09-18 ENCOUNTER — TELEPHONE (OUTPATIENT)
Age: 23
End: 2023-09-18

## 2023-09-18 VITALS
OXYGEN SATURATION: 99 % | TEMPERATURE: 97.4 F | HEART RATE: 84 BPM | SYSTOLIC BLOOD PRESSURE: 122 MMHG | DIASTOLIC BLOOD PRESSURE: 70 MMHG | RESPIRATION RATE: 16 BRPM

## 2023-09-18 DIAGNOSIS — R42 DIZZINESS: Primary | ICD-10-CM

## 2023-09-18 DIAGNOSIS — F90.0 ATTENTION DEFICIT HYPERACTIVITY DISORDER (ADHD), PREDOMINANTLY INATTENTIVE TYPE: ICD-10-CM

## 2023-09-18 PROCEDURE — 99214 OFFICE O/P EST MOD 30 MIN: CPT | Performed by: NURSE PRACTITIONER

## 2023-09-18 RX ORDER — LISDEXAMFETAMINE DIMESYLATE CAPSULES 30 MG/1
30 CAPSULE ORAL 2 TIMES DAILY
Qty: 60 CAPSULE | Refills: 0 | Status: SHIPPED | OUTPATIENT
Start: 2023-09-18 | End: 2023-10-18

## 2023-09-18 RX ORDER — LISDEXAMFETAMINE DIMESYLATE 40 MG/1
1 CAPSULE ORAL DAILY
Qty: 30 CAPSULE | Refills: 0 | Status: CANCELLED | OUTPATIENT
Start: 2023-09-18 | End: 2023-10-18

## 2023-09-18 NOTE — TELEPHONE ENCOUNTER
BP elevated on admit, now normalized  Continue lisinopril    Faxed clinicals for PA ans testing [id number]

## 2023-09-18 NOTE — PROGRESS NOTES
presenting today and we will get this as a twice daily dosing to help with that    Continue the Emgality for migraine prevention  Continue [place] for migraine as needed  We will follow-up after the test results everything looks good otherwise          This note will not be viewable in [employer]

## 2023-09-29 NOTE — TELEPHONE ENCOUNTER
Called to check status for ans testing [id number]  No PA required  #lruth A  Hold prior to testing   Cetirizine-3 days  Escitalopram-5 days  Butalbital-1 day  Lisdexamfetamine-2 days

## 2023-10-01 ENCOUNTER — PATIENT MESSAGE (OUTPATIENT)
Age: 23
End: 2023-10-01

## 2023-10-02 ENCOUNTER — TELEPHONE (OUTPATIENT)
Age: 23
End: 2023-10-02

## 2023-10-09 NOTE — TELEPHONE ENCOUNTER
Vyvanse   Sent PA to [facility]   [id number] - PA Case ID: [id number]    40 mg cap twice a day    Vyvanse APPROVAL:     Approved today  SXVAUC:[id number];KMVGHG:[id number]; Review Type:Qty;Coverage Start Date:10/11/2023; Coverage End Date:10/11/2024;

## 2023-11-07 ENCOUNTER — PATIENT MESSAGE (OUTPATIENT)
Age: 23
End: 2023-11-07

## 2023-11-09 ENCOUNTER — TELEPHONE (OUTPATIENT)
Age: 23
End: 2023-11-09

## 2023-11-09 NOTE — TELEPHONE ENCOUNTER
Emgality PA submitted via CoverMyMeds   (Key: [id number]) - [id number]    Approved       [facility]:[id number];CLASYD:[id number]; Review Type:Prior Auth; Coverage Start Date:11/09/2023; Coverage End Date:11/08/2024;

## 2023-11-13 ENCOUNTER — PROCEDURE VISIT (OUTPATIENT)
Age: 23
End: 2023-11-13
Payer: COMMERCIAL

## 2023-11-13 DIAGNOSIS — R42 LIGHTHEADED: Primary | ICD-10-CM

## 2023-11-13 PROCEDURE — 95924 ANS PARASYMP & SYMP W/TILT: CPT | Performed by: PSYCHIATRY & NEUROLOGY

## 2023-11-13 PROCEDURE — 95923 AUTONOMIC NRV SYST FUNJ TEST: CPT | Performed by: PSYCHIATRY & NEUROLOGY

## 2023-11-13 NOTE — PROGRESS NOTES
[facility]  [address] [place], [address] [facility]  Jesús Garcia    [id number]  13 y.o.  2000     REFERRED BY: Mansoor Knight NP  PCP: ABBI George    Date of Testin2023       Indication/History:    Episodes of lightheadedness and fatigue (+) headaches    Patient is coming for syncope/autonomic dysfunction evaluation. Medications taken 48 hrs before the test: None     Procedure: This Autonomic Nervous System (ANS) testing is performed by utilizing [facility] [employer] Autonomic System, with established protocol. Multiple procedures performed: Heart rate response to deep breathing (HRDB), Valsalva ratio, Heart rate and BP response to head up tilt (HUT), and Quantitative sudomotor axon reflex testing (QSWEAT) . Result:  Heart response to deep  breathing (HRDB): 2 series of 6 cycles were performed and the mean of 6 consecutive cycles was obtained. Average HR difference was 19.09 and E:I ratio was 1.27. Normal response    Heart rate response to Valsalva maneuver:  [id number] BP to Valsalva was measured and BP response in all 4 phases was normal. Heart response was the opposite of BP, a normal response. ( VR = 2.03 )  HUT (head-up tilt) : [id number]-[id number] BP and HR were measured, up to 15 minutes post tilt. No significant BP reduction or HR acceleration/deceleration. SUDOMOTOR: QSWEAT response showed relatively preserved sweat production in all 4 localities (forearm, proximal leg, distal leg, foot) of the right upper and lower limbs, comparing patient to age group. Impression:   NORMAL    Relatively preserved autonomic function for this age.          Ronda Phipps MD  Diplomate, American Board of Psychiatry and Neurology  Diplomate, Neuromuscular Medicine  Diplomate, American Board of Electrodiagnostic Medicine    Note: Raw Data will be scanned separately in Media Patient/Mother

## 2023-12-02 ENCOUNTER — PATIENT MESSAGE (OUTPATIENT)
Age: 23
End: 2023-12-02

## 2023-12-09 DIAGNOSIS — G43.909 MIGRAINE WITHOUT STATUS MIGRAINOSUS, NOT INTRACTABLE, UNSPECIFIED MIGRAINE TYPE: ICD-10-CM

## 2023-12-11 RX ORDER — GALCANEZUMAB 120 MG/ML
INJECTION, SOLUTION SUBCUTANEOUS
Qty: 1 ML | Refills: 2 | Status: SHIPPED | OUTPATIENT
Start: 2023-12-11

## 2024-03-01 DIAGNOSIS — G43.909 MIGRAINE WITHOUT STATUS MIGRAINOSUS, NOT INTRACTABLE, UNSPECIFIED MIGRAINE TYPE: ICD-10-CM

## 2024-03-02 DIAGNOSIS — G43.909 MIGRAINE WITHOUT STATUS MIGRAINOSUS, NOT INTRACTABLE, UNSPECIFIED MIGRAINE TYPE: ICD-10-CM

## 2024-03-02 RX ORDER — GALCANEZUMAB 120 MG/ML
INJECTION, SOLUTION SUBCUTANEOUS
Qty: 1 ML | Refills: 2 | Status: SHIPPED | OUTPATIENT
Start: 2024-03-02

## 2024-03-02 RX ORDER — GALCANEZUMAB 120 MG/ML
INJECTION, SOLUTION SUBCUTANEOUS
Qty: 1 ML | Refills: 1 | OUTPATIENT
Start: 2024-03-02

## 2024-05-21 DIAGNOSIS — G43.909 MIGRAINE WITHOUT STATUS MIGRAINOSUS, NOT INTRACTABLE, UNSPECIFIED MIGRAINE TYPE: ICD-10-CM

## 2024-05-21 RX ORDER — GALCANEZUMAB 120 MG/ML
INJECTION, SOLUTION SUBCUTANEOUS
Qty: 1 ML | Refills: 1 | Status: SHIPPED | OUTPATIENT
Start: 2024-05-21

## 2024-06-10 ENCOUNTER — OFFICE VISIT (OUTPATIENT)
Age: 24
End: 2024-06-10
Payer: COMMERCIAL

## 2024-06-10 VITALS
DIASTOLIC BLOOD PRESSURE: 71 MMHG | HEART RATE: 81 BPM | SYSTOLIC BLOOD PRESSURE: 112 MMHG | RESPIRATION RATE: 18 BRPM | OXYGEN SATURATION: 97 %

## 2024-06-10 DIAGNOSIS — R93.89 ABNORMAL MRI: ICD-10-CM

## 2024-06-10 DIAGNOSIS — G43.909 MIGRAINE WITHOUT STATUS MIGRAINOSUS, NOT INTRACTABLE, UNSPECIFIED MIGRAINE TYPE: Primary | ICD-10-CM

## 2024-06-10 PROCEDURE — 99214 OFFICE O/P EST MOD 30 MIN: CPT | Performed by: NURSE PRACTITIONER

## 2024-06-10 RX ORDER — ATOGEPANT 60 MG/1
TABLET ORAL
Qty: 30 TABLET | Refills: 5 | Status: SHIPPED | OUTPATIENT
Start: 2024-06-10

## 2024-06-11 NOTE — PROGRESS NOTES
go from here         This note was created using voice recognition software. Despite editing, there may be syntax errors.

## 2024-06-17 ENCOUNTER — TELEPHONE (OUTPATIENT)
Age: 24
End: 2024-06-17

## 2024-06-24 NOTE — TELEPHONE ENCOUNTER
RE:Qulipta  Key: [id number]      Requested medication has been approved.    CaseId:[id number];Status:Approved;Review Type:Prior Auth;Coverage Start Date:06/18/2024;Coverage End Date:06/18/2025;         Nurse notified     What Type Of Note Output Would You Prefer (Optional)?: Standard Output Hpi Title: Evaluation of Skin Lesions How Severe Are Your Spot(S)?: moderate Have Your Spot(S) Been Treated In The Past?: has not been treated

## 2024-07-22 ENCOUNTER — OFFICE VISIT (OUTPATIENT)
Age: 24
End: 2024-07-22
Payer: COMMERCIAL

## 2024-07-22 VITALS
TEMPERATURE: 97.7 F | DIASTOLIC BLOOD PRESSURE: 74 MMHG | RESPIRATION RATE: 16 BRPM | OXYGEN SATURATION: 97 % | HEART RATE: 76 BPM | SYSTOLIC BLOOD PRESSURE: 120 MMHG

## 2024-07-22 DIAGNOSIS — G43.909 MIGRAINE WITHOUT STATUS MIGRAINOSUS, NOT INTRACTABLE, UNSPECIFIED MIGRAINE TYPE: Primary | ICD-10-CM

## 2024-07-22 PROCEDURE — 99214 OFFICE O/P EST MOD 30 MIN: CPT | Performed by: NURSE PRACTITIONER

## 2024-07-23 NOTE — PROGRESS NOTES
Tami Bowser is a 24 y.o. female who presents with the following  Chief Complaint   Patient presents with    Migraine     Patient states she has had 3 migraines in the last month. Patient went from taking emgality to qulipta with minimal difference.       HPI    Patient comes back in for follow-up for MRI of the brain  This was negative for MS and radiology also pushed more towards vascular changes with migraines  We did discuss this is very reassuring    She does have a family history of MS so we will continue to monitor as symptoms come up and down    She has been on the Qulipta since last visit  60 mg daily  A little bit of positive since then as she has 3 migraines a month  She was on Emgality before this    She has the Fioricet for an as needed  She also has the Ubrelvy for as needed  She is currently on Lexapro  She is also been on an AED in the past       No Known Allergies    Current Outpatient Medications   Medication Sig Dispense Refill    Atogepant (QULIPTA) 60 MG TABS 1 tablet by mouth daily. 30 tablet 5    albuterol sulfate HFA (PROVENTIL;VENTOLIN;PROAIR) 108 (90 Base) MCG/ACT inhaler 1 puff as needed Inhalation every 4 hrs for 30 days      butalbital-acetaminophen-caffeine (FIORICET, ESGIC) -40 MG per tablet 1-2 tablets by mouth every 8 hours PRN      cetirizine (ZYRTEC ALLERGY) 10 MG tablet Zyrtec 10 mg tablet   Take 1 tablet every day by oral route.      vitamin D (ERGOCALCIFEROL) 1.25 MG (67677 UT) CAPS capsule       escitalopram (LEXAPRO) 10 MG tablet 1 tablet Orally Once a day for 90 days      UBRELVY 100 MG TABS TAKE 1 TABLET BY MOUTH AT HEADACHE ONSET. MAY REPEAT IN 2 HOURS IF NEEDED. MAX 2 TABLETS IN 24 HOURS.       No current facility-administered medications for this visit.        Social History     Tobacco Use   Smoking Status Never   Smokeless Tobacco Never       Past Medical History:   Diagnosis Date    Abnormal TSH     Asthma     Migraine     VERONICA (obstructive sleep apnea)

## 2025-02-15 DIAGNOSIS — G43.909 MIGRAINE WITHOUT STATUS MIGRAINOSUS, NOT INTRACTABLE, UNSPECIFIED MIGRAINE TYPE: ICD-10-CM

## 2025-02-19 RX ORDER — ATOGEPANT 60 MG/1
TABLET ORAL
Qty: 30 TABLET | Refills: 4 | Status: ACTIVE | OUTPATIENT
Start: 2025-02-19

## 2025-07-07 ENCOUNTER — OFFICE VISIT (OUTPATIENT)
Age: 25
End: 2025-07-07
Payer: COMMERCIAL

## 2025-07-07 VITALS
DIASTOLIC BLOOD PRESSURE: 74 MMHG | SYSTOLIC BLOOD PRESSURE: 116 MMHG | HEART RATE: 90 BPM | TEMPERATURE: 97.9 F | OXYGEN SATURATION: 100 % | RESPIRATION RATE: 16 BRPM

## 2025-07-07 DIAGNOSIS — G43.909 MIGRAINE WITHOUT STATUS MIGRAINOSUS, NOT INTRACTABLE, UNSPECIFIED MIGRAINE TYPE: Primary | ICD-10-CM

## 2025-07-07 PROCEDURE — 99214 OFFICE O/P EST MOD 30 MIN: CPT | Performed by: NURSE PRACTITIONER

## 2025-07-08 NOTE — PROGRESS NOTES
Tami Bowser is a 25 y.o. female who presents with the following  Chief Complaint   Patient presents with    Migraine     Patient states she has had 2 migraines in the last month.        HPI    FU migraines.   She has been on the Qulipta 60 mg daily  Failed Emgality before this.     Only having around 2 migraines a month   No changes with these.     She has the Fioricet for an as needed  She also has the Ubrelvy for as needed    She is currently on Lexapro  She is also been on an AED in the past    Started nursing school.       No Known Allergies    Current Outpatient Medications   Medication Sig Dispense Refill    Atogepant (QULIPTA) 60 MG TABS Take 1 tablet by mouth daily. 30 tablet 4    albuterol sulfate HFA (PROVENTIL;VENTOLIN;PROAIR) 108 (90 Base) MCG/ACT inhaler 1 puff as needed Inhalation every 4 hrs for 30 days      butalbital-acetaminophen-caffeine (FIORICET, ESGIC) -40 MG per tablet 1-2 tablets by mouth every 8 hours PRN      cetirizine (ZYRTEC ALLERGY) 10 MG tablet Zyrtec 10 mg tablet   Take 1 tablet every day by oral route.      vitamin D (ERGOCALCIFEROL) 1.25 MG (51922 UT) CAPS capsule       escitalopram (LEXAPRO) 10 MG tablet 1 tablet Orally Once a day for 90 days      UBRELVY 100 MG TABS TAKE 1 TABLET BY MOUTH AT HEADACHE ONSET. MAY REPEAT IN 2 HOURS IF NEEDED. MAX 2 TABLETS IN 24 HOURS.       No current facility-administered medications for this visit.        Social History     Tobacco Use   Smoking Status Never   Smokeless Tobacco Never       Past Medical History:   Diagnosis Date    Abnormal TSH     Asthma     Migraine     VERONICA (obstructive sleep apnea)        Past Surgical History:   Procedure Laterality Date    TYMPANOSTOMY TUBE PLACEMENT      WISDOM TOOTH EXTRACTION Bilateral        Family History   Problem Relation Age of Onset    Thyroid Disease Mother     Asthma Father     Kidney Disease Father     Neuropathy Maternal Grandmother     Epilepsy Paternal Grandmother     Mult